# Patient Record
Sex: FEMALE | Race: WHITE | NOT HISPANIC OR LATINO | Employment: FULL TIME | URBAN - METROPOLITAN AREA
[De-identification: names, ages, dates, MRNs, and addresses within clinical notes are randomized per-mention and may not be internally consistent; named-entity substitution may affect disease eponyms.]

---

## 2023-04-07 LAB
ERYTHROCYTE DISTRIBUTION WIDTH (RATIO) BY AUTOMATED COUNT: 14.3 % (ref 11.5–14.5)
ERYTHROCYTE MEAN CORPUSCULAR HEMOGLOBIN CONCENTRATION (G/DL) BY AUTOMATED: 33.1 G/DL (ref 32–36)
ERYTHROCYTE MEAN CORPUSCULAR VOLUME (FL) BY AUTOMATED COUNT: 85 FL (ref 80–100)
ERYTHROCYTES (10*6/UL) IN BLOOD BY AUTOMATED COUNT: 4.29 X10E12/L (ref 4–5.2)
HEMATOCRIT (%) IN BLOOD BY AUTOMATED COUNT: 36.6 % (ref 36–46)
HEMOGLOBIN (G/DL) IN BLOOD: 12.1 G/DL (ref 12–16)
LEUKOCYTES (10*3/UL) IN BLOOD BY AUTOMATED COUNT: 12.1 X10E9/L (ref 4.4–11.3)
PLATELETS (10*3/UL) IN BLOOD AUTOMATED COUNT: 220 X10E9/L (ref 150–450)
REFLEX ADDED, ANEMIA PANEL: ABNORMAL

## 2023-04-08 LAB
ABO GROUP (TYPE) IN BLOOD: NORMAL
ANTIBODY SCREEN: NORMAL
HEPATITIS B VIRUS SURFACE AG PRESENCE IN SERUM: NONREACTIVE
HEPATITIS C VIRUS AB PRESENCE IN SERUM: NONREACTIVE
HIV 1/ 2 AG/AB SCREEN: NONREACTIVE
RH FACTOR: NORMAL
RUBELLA VIRUS IGG AB: NORMAL
SYPHILIS TOTAL AB: NONREACTIVE

## 2023-04-09 LAB — URINE CULTURE: NORMAL

## 2023-04-11 LAB
AMPHETAMINE (PRESENCE) IN URINE BY SCREEN METHOD: ABNORMAL
BARBITURATES PRESENCE IN URINE BY SCREEN METHOD: ABNORMAL
BENZODIAZEPINE (PRESENCE) IN URINE BY SCREEN METHOD: ABNORMAL
CANNABINOIDS IN URINE BY SCREEN METHOD: ABNORMAL
COCAINE (PRESENCE) IN URINE BY SCREEN METHOD: ABNORMAL
DRUG SCREEN COMMENT URINE: ABNORMAL
FENTANYL URINE: ABNORMAL
METHADONE (PRESENCE) IN URINE BY SCREEN METHOD: ABNORMAL
OPIATES (PRESENCE) IN URINE BY SCREEN METHOD: ABNORMAL
OXYCODONE (PRESENCE) IN URINE BY SCREEN METHOD: ABNORMAL
PHENCYCLIDINE (PRESENCE) IN URINE BY SCREEN METHOD: ABNORMAL

## 2023-04-12 LAB
CHLAMYDIA TRACH., AMPLIFIED: POSITIVE
N. GONORRHEA, AMPLIFIED: NEGATIVE

## 2023-04-13 LAB
7-AMINOCLONAZEPAM: <25 NG/ML
ALPHA-HYDROXYALPRAZOLAM: <25 NG/ML
ALPHA-HYDROXYMIDAZOLAM: <25 NG/ML
ALPRAZOLAM: <25 NG/ML
CHLORDIAZEPOXIDE: <25 NG/ML
CLONAZEPAM: <25 NG/ML
DIAZEPAM: <25 NG/ML
LORAZEPAM: <25 NG/ML
MIDAZOLAM: <25 NG/ML
NORDIAZEPAM: 719 NG/ML
OXAZEPAM: >1000 NG/ML
TEMAZEPAM: >1000 NG/ML

## 2023-06-07 LAB
ERYTHROCYTE DISTRIBUTION WIDTH (RATIO) BY AUTOMATED COUNT: 13.8 % (ref 11.5–14.5)
ERYTHROCYTE MEAN CORPUSCULAR HEMOGLOBIN CONCENTRATION (G/DL) BY AUTOMATED: 32.2 G/DL (ref 32–36)
ERYTHROCYTE MEAN CORPUSCULAR VOLUME (FL) BY AUTOMATED COUNT: 88 FL (ref 80–100)
ERYTHROCYTES (10*6/UL) IN BLOOD BY AUTOMATED COUNT: 3.9 X10E12/L (ref 4–5.2)
GLUCOSE, 1 HR SCREEN, PREG: 81 MG/DL
HEMATOCRIT (%) IN BLOOD BY AUTOMATED COUNT: 34.5 % (ref 36–46)
HEMOGLOBIN (G/DL) IN BLOOD: 11.1 G/DL (ref 12–16)
LEUKOCYTES (10*3/UL) IN BLOOD BY AUTOMATED COUNT: 11.3 X10E9/L (ref 4.4–11.3)
PLATELETS (10*3/UL) IN BLOOD AUTOMATED COUNT: 218 X10E9/L (ref 150–450)
REFLEX ADDED, ANEMIA PANEL: ABNORMAL

## 2023-06-08 LAB
ANTIBODY SCREEN: NORMAL
CHLAMYDIA TRACH., AMPLIFIED: NEGATIVE

## 2023-08-18 LAB
CHLAMYDIA TRACH., AMPLIFIED: NEGATIVE
CLUE CELLS: PRESENT
N. GONORRHEA, AMPLIFIED: NEGATIVE
NUGENT SCORE: 7
TRICHOMONAS VAGINALIS: NEGATIVE
YEAST: PRESENT

## 2023-08-28 LAB — GROUP B STREP SCREEN: NORMAL

## 2023-10-03 ENCOUNTER — TELEPHONE (OUTPATIENT)
Dept: OBSTETRICS AND GYNECOLOGY | Facility: HOSPITAL | Age: 28
End: 2023-10-03
Payer: MEDICAID

## 2023-10-03 NOTE — PROGRESS NOTES
Follow-Up Note    Care Type: Women's Health  Phone Number Called: [phone]  Call Outcome (connected, left message, no answer, phone disconnected): left message  Patient reports feeling symptoms are (better, worse, same):       After returning home from the hospital, was it clear to you:   Which Meds were new Meds?   Which Meds to continue?   Which Meds to stop?   Who participated in medication reconciliation with the hospital staff (you, family, other, no one):     To be answered by care coordinator or staff member doing call back:   In your professional opinion, do you think there was a medication discrepancy or potential for medication discrepancy in this situation?     Medication issues (none, confusion which medications to take, non-adherence to medication, prescription unfilled-inadequate funds, prescription unfilled-pharmacy will not fill( prescription unfilled-unable to get to pharmacy, troubled by side effects, other-see comments):     Discharge Instructions were clear:  Patient has a primary care provider:   Post-hospital follow-up occurred according to schedule:   Reason (appointment scheduled in future, appointment was never scheduled-encouraged patient to call,   no appointment available within discharge plan, patient missed appointment):     Delivered baby(ies):   Chest Pain?:  SOB or difficulty breathing?:   Seizures?:  Any thoughts of hurting yourself or your baby?:  Bleeding that is soaking through one pad/hour or blood clots the size of an egg or larger?:  Temperature of 100.4*F or higher?:  Headache that does not get better, even after taking medicine, or a bad headache with vision changes?:    Where or in what is your baby sleeping?  ABC's of sleep covered?   How are you feeding your baby(ies)-breast, EBM, formula, supplementation?:   Baby getting anything other than breastmilk or formula for food?    Patient has Primary Care Provider for baby(ies)?   Baby has been seen by a health care  provider since discharge?  If no, reason (appointment scheduled in the future, appointment was never scheduled, no appointment available within discharge plan,   patient missed appointment):     Mom's Discharge Date:     Patient has specific name and number of who to call for concerns?:     Date/Time of Call:   Call back done by (care coordinator, relationship based nurse, patient returned call, , lactation consultant, manager/supervisor, patient navigator, social work,   other-see comments):     Comments:               Attempt Date 1:   Call Attempt 1 outcome: done via [facility], left message.     Attempt Date 2: 10/3/2023  Call Attempt 2 outcome: left message

## 2023-10-05 PROBLEM — G43.909 MIGRAINE: Status: ACTIVE | Noted: 2023-10-05

## 2023-10-05 PROBLEM — J30.2 SEASONAL ALLERGIES: Status: ACTIVE | Noted: 2023-10-05

## 2023-10-05 PROBLEM — N76.0 BACTERIAL VAGINOSIS: Status: ACTIVE | Noted: 2023-10-05

## 2023-10-05 PROBLEM — Z30.9 CONTRACEPTION MANAGEMENT: Status: ACTIVE | Noted: 2023-10-05

## 2023-10-05 PROBLEM — N89.8 VAGINAL DISCHARGE: Status: ACTIVE | Noted: 2023-10-05

## 2023-10-05 PROBLEM — B96.89 BACTERIAL VAGINOSIS: Status: ACTIVE | Noted: 2023-10-05

## 2023-10-05 PROBLEM — R30.0 DYSURIA: Status: ACTIVE | Noted: 2023-10-05

## 2023-10-05 PROBLEM — N89.8 VAGINAL ODOR: Status: ACTIVE | Noted: 2023-10-05

## 2023-10-05 RX ORDER — DIAZEPAM 10 MG/1
10 TABLET ORAL 2 TIMES DAILY PRN
COMMUNITY
Start: 2023-09-07

## 2023-10-05 RX ORDER — SERTRALINE HYDROCHLORIDE 100 MG/1
100 TABLET, FILM COATED ORAL NIGHTLY
COMMUNITY
Start: 2023-04-16

## 2023-10-05 RX ORDER — TEMAZEPAM 15 MG/1
15 CAPSULE ORAL NIGHTLY
COMMUNITY
Start: 2023-07-28

## 2023-10-05 RX ORDER — ONDANSETRON 4 MG/1
4 TABLET, FILM COATED ORAL EVERY 12 HOURS PRN
COMMUNITY
Start: 2023-04-08

## 2023-10-26 ENCOUNTER — TELEPHONE (OUTPATIENT)
Dept: OBSTETRICS AND GYNECOLOGY | Facility: CLINIC | Age: 28
End: 2023-10-26

## 2023-10-26 ENCOUNTER — POSTPARTUM VISIT (OUTPATIENT)
Dept: OBSTETRICS AND GYNECOLOGY | Facility: CLINIC | Age: 28
End: 2023-10-26
Payer: MEDICAID

## 2023-10-26 VITALS
DIASTOLIC BLOOD PRESSURE: 78 MMHG | HEIGHT: 66 IN | BODY MASS INDEX: 33.27 KG/M2 | SYSTOLIC BLOOD PRESSURE: 120 MMHG | WEIGHT: 207 LBS

## 2023-10-26 DIAGNOSIS — Z30.013 ENCOUNTER FOR INITIAL PRESCRIPTION OF INJECTABLE CONTRACEPTIVE: ICD-10-CM

## 2023-10-26 PROCEDURE — 0503F POSTPARTUM CARE VISIT: CPT | Performed by: OBSTETRICS & GYNECOLOGY

## 2023-10-26 RX ORDER — MEDROXYPROGESTERONE ACETATE 150 MG/ML
150 INJECTION, SUSPENSION INTRAMUSCULAR
Qty: 1 ML | Refills: 3 | Status: SHIPPED | OUTPATIENT
Start: 2023-10-26 | End: 2024-10-25

## 2023-10-26 ASSESSMENT — EDINBURGH POSTNATAL DEPRESSION SCALE (EPDS)
I HAVE BEEN ABLE TO LAUGH AND SEE THE FUNNY SIDE OF THINGS: AS MUCH AS I ALWAYS COULD
TOTAL SCORE: 9
I HAVE BLAMED MYSELF UNNECESSARILY WHEN THINGS WENT WRONG: YES, SOME OF THE TIME
THINGS HAVE BEEN GETTING ON TOP OF ME: NO, MOST OF THE TIME I HAVE COPED QUITE WELL
I HAVE FELT SAD OR MISERABLE: NOT VERY OFTEN
I HAVE BEEN SO UNHAPPY THAT I HAVE BEEN CRYING: ONLY OCCASIONALLY
I HAVE BEEN ANXIOUS OR WORRIED FOR NO GOOD REASON: YES, SOMETIMES
I HAVE LOOKED FORWARD WITH ENJOYMENT TO THINGS: AS MUCH AS I EVER DID
I HAVE BEEN SO UNHAPPY THAT I HAVE HAD DIFFICULTY SLEEPING: NOT VERY OFTEN
THE THOUGHT OF HARMING MYSELF HAS OCCURRED TO ME: NEVER
I HAVE FELT SCARED OR PANICKY FOR NO GOOD REASON: NO, NOT MUCH

## 2023-10-26 NOTE — PROGRESS NOTES
"Pap:     SUBJECTIVE    HPI    26 yo  s/p  presents for postpartum visit. Vaginal lac at delivery.   Notes postpartum had a difficult time because 2 days after she got home was told she had to move out of her housing. Pt felt like she did not have time to bond with the baby. Followed by [facility] and restarted her adderall, Restoril, diazapam. Would recommend visit with [facility]. She denies thoughts of hurting herself, the baby or others. Her partner is very supportive. They moved to an apartment in [place].  Pt says she must return to work 10/31/2023. She used some of her FMLA time antepartum.   Baby has a milk allergy. Just switched to Alimentum. Pt says baby is growing well, over 10 lbs and Peds visits going well.    In past was on oral contraceptives, did not like how they made her feel.       Review of Systems    OBJECTIVE    /78   Ht 1.676 m (5' 6\")   Wt 93.9 kg (207 lb)   BMI 33.41 kg/m²     Physical Exam     Constitutional: Alert and in no acute distress. Well developed, well nourished   Head and Face: Head and face: normal   Eyes: Normal external exam - nonicteric sclera, extraocular movements intact (EOMI) and no ptosis.   Ears, Nose, Mouth, and Throat: External inspection of ears and nose: normal   Neck: no neck asymmetry. Supple and thyroid not enlarged and there were no palpable thyroid nodules   Cardiovascular: Heart rate and rhythm were normal, normal S1 and S2, no gallops, and no murmurs   Pulmonary: No respiratory distress and clear bilateral breath sounds   Chest: Breasts: normal appearance, no nipple discharge and no skin changes and palpation of breasts and axillae: no palpable mass and no axillary lymphadenopathy   Abdomen: soft nontender; no abdominal mass palpated, no organomegaly and no hernias   Genitourinary: external genitalia: normal, no inguinal lymphadenopathy, Bartholin's urethral and [place]'s glands: normal, urethra: normal, bladder: normal on palpation and perianal " area: normal   Vagina: normal.   Cervix: Normal appearing without lesions.  Uterus: Normal, mobile, nontender.  Right Adnexa/parametria: Normal.   Left Adnexa/parametria: Normal.   Skin: normal skin color and pigmentation, normal skin turgor, and no rash.   Psychiatric: alert and oriented x 3., affect normal to patient baseline and mood: appropriate      ASSESSMENT/PLAN    Normal postpartum exam.   Depoprovera 150 mg IM. Rx sent to pharmacy.   In past pt has given injection herself. Pt asks if she can resume that. I am comfortable with that. Pt given log to record her depo injections.   Recommend pt meet with [facility] counselor.     Maru Olguin MD

## 2023-10-31 ENCOUNTER — APPOINTMENT (OUTPATIENT)
Dept: OBSTETRICS AND GYNECOLOGY | Facility: CLINIC | Age: 28
End: 2023-10-31
Payer: MEDICAID

## 2023-11-03 NOTE — TELEPHONE ENCOUNTER
I CALLED PT TO ADVISE THAT I FAXED HER RETURN TO WORK LETTER TO [employer] TODAY. THE LETTER IS IN EPIC IF SHE WANTS US TO PRINT AND . I LEFT A VM ON CELL TO ADVISE/MP

## 2024-01-23 ENCOUNTER — TELEPHONE (OUTPATIENT)
Dept: OBSTETRICS AND GYNECOLOGY | Facility: CLINIC | Age: 29
End: 2024-01-23
Payer: MEDICAID

## 2024-01-23 NOTE — TELEPHONE ENCOUNTER
PT NEEDS DEPO REFILL TO [facility] SINCE [facility] CLOSED. LM PT AND SHE HAD HER ANNUAL IN 9-2023. I VERBALLY CALLED IN RX/MP

## 2025-06-01 ENCOUNTER — APPOINTMENT (OUTPATIENT)
Dept: CARDIOLOGY | Facility: HOSPITAL | Age: 30
End: 2025-06-01
Payer: MEDICAID

## 2025-06-01 ENCOUNTER — APPOINTMENT (OUTPATIENT)
Dept: RADIOLOGY | Facility: HOSPITAL | Age: 30
End: 2025-06-01
Payer: MEDICAID

## 2025-06-01 ENCOUNTER — HOSPITAL ENCOUNTER (EMERGENCY)
Facility: HOSPITAL | Age: 30
Discharge: HOME | End: 2025-06-01
Payer: MEDICAID

## 2025-06-01 VITALS
TEMPERATURE: 99 F | HEIGHT: 66 IN | OXYGEN SATURATION: 99 % | SYSTOLIC BLOOD PRESSURE: 114 MMHG | HEART RATE: 65 BPM | RESPIRATION RATE: 15 BRPM | DIASTOLIC BLOOD PRESSURE: 81 MMHG | BODY MASS INDEX: 23.53 KG/M2 | WEIGHT: 146.39 LBS

## 2025-06-01 DIAGNOSIS — R07.9 CHEST PAIN, UNSPECIFIED TYPE: Primary | ICD-10-CM

## 2025-06-01 LAB
ALBUMIN SERPL BCP-MCNC: 4.1 G/DL (ref 3.4–5)
ALP SERPL-CCNC: 57 U/L (ref 33–110)
ALT SERPL W P-5'-P-CCNC: 12 U/L (ref 7–45)
ANION GAP SERPL CALCULATED.3IONS-SCNC: 11 MMOL/L (ref 10–20)
AST SERPL W P-5'-P-CCNC: 20 U/L (ref 9–39)
BASOPHILS # BLD AUTO: 0.03 X10*3/UL (ref 0–0.1)
BASOPHILS NFR BLD AUTO: 0.3 %
BILIRUB SERPL-MCNC: 0.4 MG/DL (ref 0–1.2)
BUN SERPL-MCNC: 8 MG/DL (ref 6–23)
CALCIUM SERPL-MCNC: 9 MG/DL (ref 8.6–10.3)
CARDIAC TROPONIN I PNL SERPL HS: <3 NG/L (ref 0–13)
CHLORIDE SERPL-SCNC: 106 MMOL/L (ref 98–107)
CO2 SERPL-SCNC: 23 MMOL/L (ref 21–32)
CREAT SERPL-MCNC: 0.6 MG/DL (ref 0.5–1.05)
D DIMER PPP FEU-MCNC: <0.19 MG/L FEU (ref 0.19–0.5)
EGFRCR SERPLBLD CKD-EPI 2021: >90 ML/MIN/1.73M*2
EOSINOPHIL # BLD AUTO: 0.1 X10*3/UL (ref 0–0.7)
EOSINOPHIL NFR BLD AUTO: 1 %
ERYTHROCYTE [DISTWIDTH] IN BLOOD BY AUTOMATED COUNT: 16.2 % (ref 11.5–14.5)
GLUCOSE SERPL-MCNC: 88 MG/DL (ref 74–99)
HCT VFR BLD AUTO: 35.6 % (ref 36–46)
HGB BLD-MCNC: 11.4 G/DL (ref 12–16)
IMM GRANULOCYTES # BLD AUTO: 0.02 X10*3/UL (ref 0–0.7)
IMM GRANULOCYTES NFR BLD AUTO: 0.2 % (ref 0–0.9)
LYMPHOCYTES # BLD AUTO: 2.46 X10*3/UL (ref 1.2–4.8)
LYMPHOCYTES NFR BLD AUTO: 25.5 %
MCH RBC QN AUTO: 25.6 PG (ref 26–34)
MCHC RBC AUTO-ENTMCNC: 32 G/DL (ref 32–36)
MCV RBC AUTO: 80 FL (ref 80–100)
MONOCYTES # BLD AUTO: 1.07 X10*3/UL (ref 0.1–1)
MONOCYTES NFR BLD AUTO: 11.1 %
NEUTROPHILS # BLD AUTO: 5.97 X10*3/UL (ref 1.2–7.7)
NEUTROPHILS NFR BLD AUTO: 61.9 %
NRBC BLD-RTO: 0 /100 WBCS (ref 0–0)
PLATELET # BLD AUTO: 265 X10*3/UL (ref 150–450)
POTASSIUM SERPL-SCNC: 4.3 MMOL/L (ref 3.5–5.3)
PROT SERPL-MCNC: 6.5 G/DL (ref 6.4–8.2)
RBC # BLD AUTO: 4.46 X10*6/UL (ref 4–5.2)
SODIUM SERPL-SCNC: 136 MMOL/L (ref 136–145)
WBC # BLD AUTO: 9.7 X10*3/UL (ref 4.4–11.3)

## 2025-06-01 PROCEDURE — 93005 ELECTROCARDIOGRAM TRACING: CPT

## 2025-06-01 PROCEDURE — 71046 X-RAY EXAM CHEST 2 VIEWS: CPT | Mod: FOREIGN READ | Performed by: RADIOLOGY

## 2025-06-01 PROCEDURE — 71046 X-RAY EXAM CHEST 2 VIEWS: CPT

## 2025-06-01 PROCEDURE — 84484 ASSAY OF TROPONIN QUANT: CPT | Performed by: PHYSICIAN ASSISTANT

## 2025-06-01 PROCEDURE — 80053 COMPREHEN METABOLIC PANEL: CPT | Performed by: PHYSICIAN ASSISTANT

## 2025-06-01 PROCEDURE — 99285 EMERGENCY DEPT VISIT HI MDM: CPT | Mod: 25

## 2025-06-01 PROCEDURE — 85025 COMPLETE CBC W/AUTO DIFF WBC: CPT | Performed by: PHYSICIAN ASSISTANT

## 2025-06-01 PROCEDURE — 85300 ANTITHROMBIN III ACTIVITY: CPT | Performed by: PHYSICIAN ASSISTANT

## 2025-06-01 PROCEDURE — 36415 COLL VENOUS BLD VENIPUNCTURE: CPT | Performed by: PHYSICIAN ASSISTANT

## 2025-06-01 ASSESSMENT — PAIN DESCRIPTION - ORIENTATION: ORIENTATION: MID

## 2025-06-01 ASSESSMENT — COLUMBIA-SUICIDE SEVERITY RATING SCALE - C-SSRS
1. IN THE PAST MONTH, HAVE YOU WISHED YOU WERE DEAD OR WISHED YOU COULD GO TO SLEEP AND NOT WAKE UP?: NO
6. HAVE YOU EVER DONE ANYTHING, STARTED TO DO ANYTHING, OR PREPARED TO DO ANYTHING TO END YOUR LIFE?: NO
2. HAVE YOU ACTUALLY HAD ANY THOUGHTS OF KILLING YOURSELF?: NO

## 2025-06-01 ASSESSMENT — PAIN SCALES - GENERAL
PAINLEVEL_OUTOF10: 6
PAINLEVEL_OUTOF10: 6

## 2025-06-01 ASSESSMENT — PAIN DESCRIPTION - DESCRIPTORS
DESCRIPTORS: SORE
DESCRIPTORS: OTHER (COMMENT);SHARP
DESCRIPTORS: TIGHTNESS

## 2025-06-01 ASSESSMENT — PAIN DESCRIPTION - FREQUENCY: FREQUENCY: CONSTANT/CONTINUOUS

## 2025-06-01 ASSESSMENT — PAIN DESCRIPTION - LOCATION: LOCATION: CHEST

## 2025-06-01 ASSESSMENT — PAIN - FUNCTIONAL ASSESSMENT: PAIN_FUNCTIONAL_ASSESSMENT: 0-10

## 2025-06-01 NOTE — ED PROVIDER NOTES
HPI   Chief Complaint   Patient presents with    Chest Pain     Started 3 days ago. Mid sternal, denies radiation. Does reports sob. Described as tightness with intermittent sharp pain. Denies cardiac hx. Was  taken off of diazepam and placed on lorazepam  1 month ago.        29-year-old female presented emergency department with a chief complaint of 3 days of intermittent midsternal chest discomfort described as sharp and intermittent.  Has some mild shortness of breath.  History of anxiety.  Denies birth control.  Denies history of PE DVT.  Denies recent travel.  Well-appearing nontoxic afebrile.  Denies lower extremity edema.  No other complaint.              Patient History   Medical History[1]  Surgical History[2]  Family History[3]  Social History[4]    Physical Exam   ED Triage Vitals [06/01/25 1745]   Temperature Heart Rate Respirations BP   37.2 °C (99 °F) 65 15 114/81      Pulse Ox Temp Source Heart Rate Source Patient Position   99 % Tympanic Monitor Sitting      BP Location FiO2 (%)     Right arm --       Physical Exam  Vitals and nursing note reviewed.   Constitutional:       Appearance: She is well-developed.   HENT:      Head: Normocephalic.   Cardiovascular:      Rate and Rhythm: Normal rate and regular rhythm.      Heart sounds: Normal heart sounds.   Pulmonary:      Effort: Pulmonary effort is normal.      Breath sounds: Normal breath sounds.   Abdominal:      Palpations: Abdomen is soft.   Musculoskeletal:      Cervical back: Normal range of motion.      Right lower leg: No edema.      Left lower leg: No edema.   Skin:     General: Skin is warm.   Neurological:      General: No focal deficit present.      Mental Status: She is alert and oriented to person, place, and time.   Psychiatric:         Mood and Affect: Mood normal.           ED Course & MDM   ED Course as of 06/01/25 1941   Sun Jun 01, 2025   2643 EKG on my interpretation shows normal sinus rhythm, rate of 68 bpm.  Normal axis.  QTc 404  ms, TN interval 144.  No ST elevation or depression, no acute ischemic pattern.  No STEMI.  Normal EKG. [NT]      ED Course User Index  [NT] Seth CAPUTO Gumeyonihoward          Diagnoses as of 06/01/25 1941   Chest pain, unspecified type                 No data recorded     Mila Coma Scale Score: 15 (06/01/25 1746 : Pierre Farias, ASHLEY)                           Medical Decision Making  I have seen and evaluated this patient.  Physician available for consultation.  Vital signs have been reviewed.  All laboratory and diagnostic imaging is reviewed by myself and interpreted by myself unless otherwise stated.  Additionally imaging is interpreted by radiologist.    CBC without significant leukocytosis or anemia, metabolic panel without significant renal impairment or electrolyte abnormality.  Troponin within an appropriate range without significant delta change, chest x-ray without actionable cardiopulmonary process, EKG without evidence of acute ischemia.  D-dimer negative.  Patient feels improved.  Released in stable condition from emergent standpoint with outpatient follow-up.    Labs Reviewed  CBC WITH AUTO DIFFERENTIAL - Abnormal     WBC                           9.7                    nRBC                          0.0                    RBC                           4.46                   Hemoglobin                    11.4 (*)               Hematocrit                    35.6 (*)               MCV                           80                     MCH                           25.6 (*)               MCHC                          32.0                   RDW                           16.2 (*)               Platelets                     265                    Neutrophils %                 61.9                   Immature Granulocytes %, Automated   0.2                    Lymphocytes %                 25.5                   Monocytes %                   11.1                   Eosinophils %                 1.0                     Basophils %                   0.3                    Neutrophils Absolute          5.97                   Immature Granulocytes Absolute, Au*   0.02                   Lymphocytes Absolute          2.46                   Monocytes Absolute            1.07 (*)               Eosinophils Absolute          0.10                   Basophils Absolute            0.03                D-DIMER, NON VTE - Abnormal     D-Dimer Non VTE, Quant (mg/L FEU)   <0.19 (*)                 Narrative: THROMBOEMBOLIC EVENTS CANNOT BE EXCLUDED SOLELY ON THE BASIS OF THE D-DIMER LEVEL BEING WITHIN THE NORMAL REFERENCE RANGE. D-DIMER LEVELS LESS THAN 0.5 MG/L FEU IN CONJUNCTION WITH A LOW CLINICAL PROBABILITY HAVE AN EXCELLENT NEGATIVE PREDICTIVE VALUE IN EXCLUDING A DIAGNOSIS OF PULMONARY EMBOLUS (PE) OR DEEP VEIN THROMBOSIS (DVT). ELEVATED D-DIMER LEVELS ARE NOT SPECIFIC TO PE OR DVT, AND MAY BE SEEN IN PATIENTS WITH DIC, ADVANCED AGE, PREGNANCY, MALIGNANCY, LIVER DISEASE, INFECTION, AND INFLAMMATORY CONDITIONS AMONG OTHERS. D-DIMER LEVELS MAY BE DECREASED IN PATIENTS RECEIVING ANTI-COAGULATION THERAPY.  COMPREHENSIVE METABOLIC PANEL - Normal     Glucose                       88                     Sodium                        136                    Potassium                     4.3                    Chloride                      106                    Bicarbonate                   23                     Anion Gap                     11                     Urea Nitrogen                 8                      Creatinine                    0.60                   eGFR                          >90                    Calcium                       9.0                    Albumin                       4.1                    Alkaline Phosphatase          57                     Total Protein                 6.5                    AST                           20                     Bilirubin, Total              0.4                    ALT                            12                  SERIAL TROPONIN-INITIAL - Normal     Troponin I, High Sensitivity   <3                       Narrative: Less than 99th percentile of normal range cutoff-                Female and children under 18 years old <14 ng/L; Male <21 ng/L: Negative                Repeat testing should be performed if clinically indicated.                                 Female and children under 18 years old 14-50 ng/L; Male 21-50 ng/L:                Consistent with possible cardiac damage and possible increased clinical                 risk. Serial measurements may help to assess extent of myocardial damage.                                 >50 ng/L: Consistent with cardiac damage, increased clinical risk and                myocardial infarction. Serial measurements may help assess extent of                 myocardial damage.                                  NOTE: Children less than 1 year old may have higher baseline troponin                 levels and results should be interpreted in conjunction with the overall                 clinical context.                                 NOTE: Troponin I testing is performed using a different                 testing methodology at [facility] than at other                 [facility]. Direct result comparisons should only                 be made within the same method.  TROPONIN SERIES- (INITIAL, 1 HR)       Narrative: The following orders were created for panel order Troponin I Series, High Sensitivity (0, 1 HR).                Procedure                               Abnormality         Status                                   ---------                               -----------         ------                                   Troponin I, High Sensiti...[[id number]]  Normal              Final result                             Troponin, High Sensitivi...[[id number]]                                                                                Please view results for these tests on the individual orders.  URINALYSIS WITH REFLEX CULTURE AND MICROSCOPIC       Narrative: The following orders were created for panel order Urinalysis with Reflex Culture and Microscopic.                Procedure                               Abnormality         Status                                   ---------                               -----------         ------                                   Urinalysis with Reflex C...[[id number]]                                                               Extra Urine Gray Tube[[id number]]                                                                                     Please view results for these tests on the individual orders.  HCG, URINE, QUALITATIVE  URINALYSIS WITH REFLEX CULTURE AND MICROSCOPIC  EXTRA URINE GRAY TUBE  SERIAL TROPONIN, 1 HOUR  XR chest 2 views   Final Result    No acute cardiopulmonary disease.    Signed by Hernando Larsen DO     Medications - No data to display  Discharge Medication List as of 6/1/2025  7:33 PM                  Procedure  Procedures       [1]   Past Medical History:  Diagnosis Date    Other specified health status     Patient denies medical problems    Varicella without complication     Varicella without complication   [2]   Past Surgical History:  Procedure Laterality Date    OTHER SURGICAL HISTORY  02/13/2019    Abscess incision and drainage   [3]   Family History  Problem Relation Name Age of Onset    Miscarriages / Stillbirths Mother      Other (CVA) Other     [4]   Social History  Tobacco Use    Smoking status: Never    Smokeless tobacco: Never   Vaping Use    Vaping status: Never Used   Substance Use Topics    Alcohol use: Not Currently    Drug use: Never        Dionicio Ruggiero PA-C  06/01/25 1947

## 2025-06-02 LAB
ATRIAL RATE: 68 BPM
P AXIS: -27 DEGREES
P OFFSET: 191 MS
P ONSET: 150 MS
PR INTERVAL: 144 MS
Q ONSET: 222 MS
QRS COUNT: 11 BEATS
QRS DURATION: 74 MS
QT INTERVAL: 380 MS
QTC CALCULATION(BAZETT): 404 MS
QTC FREDERICIA: 396 MS
R AXIS: 69 DEGREES
T AXIS: 49 DEGREES
T OFFSET: 412 MS
VENTRICULAR RATE: 68 BPM

## 2025-06-11 ENCOUNTER — APPOINTMENT (OUTPATIENT)
Age: 30
End: 2025-06-11
Payer: MEDICAID

## 2025-09-04 ENCOUNTER — HOSPITAL ENCOUNTER (OUTPATIENT)
Dept: RADIOLOGY | Facility: HOSPITAL | Age: 30
Discharge: HOME | End: 2025-09-04
Payer: COMMERCIAL

## 2025-09-04 ENCOUNTER — OFFICE VISIT (OUTPATIENT)
Dept: URGENT CARE | Age: 30
End: 2025-09-04
Payer: COMMERCIAL

## 2025-09-04 VITALS
BODY MASS INDEX: 23.57 KG/M2 | OXYGEN SATURATION: 98 % | WEIGHT: 146 LBS | SYSTOLIC BLOOD PRESSURE: 110 MMHG | HEART RATE: 85 BPM | TEMPERATURE: 97.8 F | DIASTOLIC BLOOD PRESSURE: 73 MMHG | RESPIRATION RATE: 14 BRPM

## 2025-09-04 DIAGNOSIS — M25.512 ACUTE PAIN OF LEFT SHOULDER: Primary | ICD-10-CM

## 2025-09-04 PROCEDURE — 73030 X-RAY EXAM OF SHOULDER: CPT | Mod: LT

## 2025-09-04 RX ORDER — NAPROXEN 500 MG/1
500 TABLET ORAL
Qty: 20 TABLET | Refills: 0 | Status: SHIPPED | OUTPATIENT
Start: 2025-09-04 | End: 2025-09-14

## 2025-09-04 RX ORDER — DEXTROAMPHETAMINE SACCHARATE, AMPHETAMINE ASPARTATE, DEXTROAMPHETAMINE SULFATE AND AMPHETAMINE SULFATE 7.5; 7.5; 7.5; 7.5 MG/1; MG/1; MG/1; MG/1
1 TABLET ORAL
COMMUNITY
Start: 2025-08-25

## 2025-09-04 RX ORDER — LIDOCAINE 50 MG/G
1 PATCH TOPICAL DAILY
Qty: 7 PATCH | Refills: 0 | Status: SHIPPED | OUTPATIENT
Start: 2025-09-04

## 2025-09-04 RX ORDER — METHOCARBAMOL 500 MG/1
500 TABLET, FILM COATED ORAL 2 TIMES DAILY
Qty: 14 TABLET | Refills: 0 | Status: SHIPPED | OUTPATIENT
Start: 2025-09-04 | End: 2025-09-11